# Patient Record
Sex: FEMALE | Race: WHITE
[De-identification: names, ages, dates, MRNs, and addresses within clinical notes are randomized per-mention and may not be internally consistent; named-entity substitution may affect disease eponyms.]

---

## 2019-01-18 ENCOUNTER — HOSPITAL ENCOUNTER (OUTPATIENT)
Dept: HOSPITAL 91 - GIL | Age: 69
Discharge: HOME | End: 2019-01-18
Payer: COMMERCIAL

## 2019-01-18 ENCOUNTER — HOSPITAL ENCOUNTER (OUTPATIENT)
Dept: HOSPITAL 10 - GIL | Age: 69
Discharge: HOME | End: 2019-01-18
Attending: INTERNAL MEDICINE
Payer: COMMERCIAL

## 2019-01-18 VITALS — SYSTOLIC BLOOD PRESSURE: 149 MMHG | DIASTOLIC BLOOD PRESSURE: 66 MMHG | RESPIRATION RATE: 18 BRPM | HEART RATE: 54 BPM

## 2019-01-18 VITALS
WEIGHT: 187.83 LBS | BODY MASS INDEX: 37.87 KG/M2 | BODY MASS INDEX: 37.87 KG/M2 | WEIGHT: 187.83 LBS | HEIGHT: 59 IN | HEIGHT: 59 IN

## 2019-01-18 VITALS — HEART RATE: 65 BPM | SYSTOLIC BLOOD PRESSURE: 157 MMHG | DIASTOLIC BLOOD PRESSURE: 87 MMHG | RESPIRATION RATE: 20 BRPM

## 2019-01-18 DIAGNOSIS — Z12.11: Primary | ICD-10-CM

## 2019-01-18 DIAGNOSIS — K64.0: ICD-10-CM

## 2019-01-18 DIAGNOSIS — E66.9: ICD-10-CM

## 2019-01-18 DIAGNOSIS — K57.30: ICD-10-CM

## 2019-01-18 PROCEDURE — 45378 DIAGNOSTIC COLONOSCOPY: CPT

## 2019-01-18 NOTE — PAC
Date/Time of Note


Date/Time of Note


DATE: 1/18/19 


TIME: 14:24





Post-Anesthesia Notes


Post-Anesthesia Note


Last documented vital signs





Vital Signs


  Date      Temp  Pulse  Resp  B/P (MAP)   Pulse Ox  O2          O2 Flow    FiO2


Time                                                 Delivery    Rate


   1/18/19  98.0     65    20      157/87        97  Room Air


      1423                          (110)





Activity:  WNL


Respiratory function:  WNL


Cardiovascular function:  WNL


Mental status:  Baseline


Pain reasonably controlled:  Yes


Hydration appropriate:  Yes


Nausea/Vomiting absent:  Yes











JOSE MATTHEWS                 Jan 18, 2019 14:24

## 2019-01-18 NOTE — PREAC
Date/Time of Note


Date/Time of Note


DATE: 1/18/19 


TIME: 13:59





Anesthesia Eval and Record


Evaluation


Time Pre-Procedure Interview


DATE: 1/18/19 


TIME: 13:59


Age


68


Sex


female


NPO:  8 hrs


Preoperative diagnosis


SCREENING


Planned procedure


COLON





Past Medical History


Past Medical History:  Includes


GI:  Obesity





Surgery & Anesthesia Issues


No known issue





Meds


Anticoagulation:  No


Beta Blocker within 24 hr:  No


Reason Beta Blocker not given:  Pt. not on B-Blocker


Reported Medications


[None]   No Conflict Check


   1/18/19


Meds reviewed:  Yes





Allergies


Coded Allergies:  


     No Known Allergy (Unverified , 1/18/19)


Allergies Reviewed:  Yes





Labs/Studies


Labs Reviewed:  Reviewed by anesthesiologist


Pregnancy test:  N/A





Pre-procedure Exam


Airway:  Adequate mouth opening, Adequate thyromental dist


Mallampati:  Mallampati II


Teeth:  Normal


Lung:  Normal


Heart:  Normal





ASA Physical Status


ASA physical status:  2


Emergency:  None





Planned Anesthetic


General/MAC:  MAC





Planned Pain Management


Parenteral pain med





Pre-operative Attestations


Prior to commencing anesthesia and surgery, the patient was re-evaluated, there 


was verification of:


*The patient's identity


*The results of appropriate recent lab work and preoperative vital signs


*The above evaluation not changing prior to induction


*Anesthetic plan, risk benefits, alternative and complications discussed with 


patient/family; questions answered; patient/family understands, accepts and 


wishes to proceed.











JOSE MATTHEWS                 Jan 18, 2019 14:00

## 2019-01-18 NOTE — HPN
Date/Time of Note


Date/Time of Note


DATE: 1/18/19 


TIME: 14:03





Interval H&P Admission Note


Pt. seen H&P reviewed:  No system changes











JOSH MITCHELL                     Jan 18, 2019 14:03